# Patient Record
Sex: FEMALE | Race: WHITE | Employment: FULL TIME | URBAN - METROPOLITAN AREA
[De-identification: names, ages, dates, MRNs, and addresses within clinical notes are randomized per-mention and may not be internally consistent; named-entity substitution may affect disease eponyms.]

---

## 2017-01-06 ENCOUNTER — HOSPITAL ENCOUNTER (OUTPATIENT)
Dept: GENERAL RADIOLOGY | Age: 62
Discharge: HOME | End: 2017-01-06
Attending: INTERNAL MEDICINE
Payer: COMMERCIAL

## 2017-01-06 DIAGNOSIS — J40 BRONCHITIS: ICD-10-CM

## 2017-01-06 PROCEDURE — 71020 XR CHEST PA + LAT CHEST (CPT=71020): CPT

## 2017-01-21 PROCEDURE — 84443 ASSAY THYROID STIM HORMONE: CPT | Performed by: INTERNAL MEDICINE

## 2017-01-21 PROCEDURE — 85025 COMPLETE CBC W/AUTO DIFF WBC: CPT | Performed by: INTERNAL MEDICINE

## 2017-01-21 PROCEDURE — 80053 COMPREHEN METABOLIC PANEL: CPT | Performed by: INTERNAL MEDICINE

## 2017-01-21 PROCEDURE — 82306 VITAMIN D 25 HYDROXY: CPT | Performed by: INTERNAL MEDICINE

## 2017-01-21 PROCEDURE — 80061 LIPID PANEL: CPT | Performed by: INTERNAL MEDICINE

## 2018-08-18 ENCOUNTER — HOSPITAL ENCOUNTER (OUTPATIENT)
Dept: MAMMOGRAPHY | Age: 63
Discharge: HOME | End: 2018-08-18
Attending: INTERNAL MEDICINE
Payer: COMMERCIAL

## 2018-08-18 DIAGNOSIS — Z12.31 SCREENING MAMMOGRAM, ENCOUNTER FOR: ICD-10-CM

## 2018-08-18 PROCEDURE — 77067 SCR MAMMO BI INCL CAD: CPT | Performed by: INTERNAL MEDICINE

## 2018-08-25 ENCOUNTER — HOSPITAL ENCOUNTER (OUTPATIENT)
Dept: GENERAL RADIOLOGY | Age: 63
Discharge: HOME | End: 2018-08-25
Attending: INTERNAL MEDICINE
Payer: COMMERCIAL

## 2018-08-25 DIAGNOSIS — M54.12 CERVICAL RADICULOPATHY: ICD-10-CM

## 2018-08-25 DIAGNOSIS — S90.32XA CONTUSION OF LEFT FOOT, INITIAL ENCOUNTER: ICD-10-CM

## 2018-08-25 PROCEDURE — 72040 X-RAY EXAM NECK SPINE 2-3 VW: CPT | Performed by: INTERNAL MEDICINE

## 2018-08-25 PROCEDURE — 73630 X-RAY EXAM OF FOOT: CPT | Performed by: INTERNAL MEDICINE

## 2018-08-27 ENCOUNTER — TELEPHONE (OUTPATIENT)
Dept: PULMONOLOGY | Facility: CLINIC | Age: 63
End: 2018-08-27

## 2018-08-27 NOTE — TELEPHONE ENCOUNTER
Pt requesting to be seen sooner than next avail for consult as new pt for CASSANDRA. Pls call  Thank you.

## 2018-08-27 NOTE — TELEPHONE ENCOUNTER
LMTCB to confirm appt w/ Dr. Mary Rivas for [facility] 9/19 @ 4 pm for sleep consult

## 2018-08-29 NOTE — TELEPHONE ENCOUNTER
Pt is calling back to see if she can be seen sooner then first available states she had an appt but had to cancel due to being out of town.  Pt is also requesting information on where she can get her cpap machine repaired states she had it for 6 years there

## 2018-08-29 NOTE — TELEPHONE ENCOUNTER
Spoke w/ pt offered sooner appt 9/14 @ 4 pm in [place], location information provided. Pt informed we are unable to place any CPAP orders until pt is seen.  Pt reports PCP is the ordering MD for CPAP informed pt PCP can place order to CPAP company they [employer]

## 2018-08-30 PROBLEM — S92.535A CLOSED NONDISPLACED FRACTURE OF DISTAL PHALANX OF LESSER TOE OF LEFT FOOT, INITIAL ENCOUNTER: Status: ACTIVE | Noted: 2018-08-30

## 2018-09-14 ENCOUNTER — OFFICE VISIT (OUTPATIENT)
Dept: PULMONOLOGY | Facility: CLINIC | Age: 63
End: 2018-09-14
Payer: COMMERCIAL

## 2018-09-14 VITALS
OXYGEN SATURATION: 97 % | HEART RATE: 114 BPM | SYSTOLIC BLOOD PRESSURE: 155 MMHG | WEIGHT: 236 LBS | HEIGHT: 67 IN | DIASTOLIC BLOOD PRESSURE: 101 MMHG | RESPIRATION RATE: 18 BRPM | BODY MASS INDEX: 37.04 KG/M2

## 2018-09-14 DIAGNOSIS — G47.33 OSA (OBSTRUCTIVE SLEEP APNEA): Primary | ICD-10-CM

## 2018-09-14 PROCEDURE — 99212 OFFICE O/P EST SF 10 MIN: CPT | Performed by: INTERNAL MEDICINE

## 2018-09-14 PROCEDURE — 99243 OFF/OP CNSLTJ NEW/EST LOW 30: CPT | Performed by: INTERNAL MEDICINE

## 2018-09-14 NOTE — PROGRESS NOTES
Hortencia Bledsoe , [address], [place]    Report of Consultation    Rafa Najera Patient Status:  Outpatient    1955 MRN [id number]   Location [address] [address] [facility], [address] Attending No att. providers found   Hosp Day # 0 PCP Cali Summers Comment:  SMR Inferior turbinates  2009: OTHER SURGICAL HISTORY      Comment:  UPPP  No date: T&A    Family History  Family History   Problem Relation Age of Onset   • Cancer Mother    • Melanoma Other         Malignant melanoma   • Diabetes Neg    • Awanda Congress weight reduction     F/u in 3 months                        Thank you for allowing me to participate in the care of your patient. Gregorio Zabala  9/14/2018

## 2018-12-14 ENCOUNTER — OFFICE VISIT (OUTPATIENT)
Dept: PULMONOLOGY | Facility: CLINIC | Age: 63
End: 2018-12-14
Payer: COMMERCIAL

## 2018-12-14 VITALS
DIASTOLIC BLOOD PRESSURE: 98 MMHG | SYSTOLIC BLOOD PRESSURE: 159 MMHG | BODY MASS INDEX: 34.63 KG/M2 | WEIGHT: 220.63 LBS | HEART RATE: 106 BPM | HEIGHT: 67 IN | RESPIRATION RATE: 18 BRPM

## 2018-12-14 DIAGNOSIS — G47.33 OSA ON CPAP: Primary | ICD-10-CM

## 2018-12-14 DIAGNOSIS — Z99.89 OSA ON CPAP: Primary | ICD-10-CM

## 2018-12-14 PROCEDURE — 99212 OFFICE O/P EST SF 10 MIN: CPT | Performed by: INTERNAL MEDICINE

## 2018-12-14 PROCEDURE — 99213 OFFICE O/P EST LOW 20 MIN: CPT | Performed by: INTERNAL MEDICINE

## 2018-12-14 RX ORDER — MULTIVITAMIN
TABLET ORAL
COMMUNITY

## 2018-12-14 NOTE — PROGRESS NOTES
HPI:    Patient ID: Fabby Monsalve is a 59year old female. HPI    Review of Systems         Current Outpatient Medications:  Multiple Vitamin (MULTI-VITAMIN DAILY) Oral Tab Take by mouth.  Disp:  Rfl:    furosemide (LASIX) 20 MG Oral Tab Take 1 table

## 2019-04-12 ENCOUNTER — TELEPHONE (OUTPATIENT)
Dept: PULMONOLOGY | Facility: CLINIC | Age: 64
End: 2019-04-12

## 2019-04-12 NOTE — TELEPHONE ENCOUNTER
Britta/TANVIR calling to confirm fax sent 4/10/19 was received for cpap supplies, pls call at:[phone],thanks.

## 2019-04-12 NOTE — TELEPHONE ENCOUNTER
Informed Maryanne Guardado from [address] [place] [address] that fax was received for CPAP supplies. Informed her will fax order back once Dr. Chris Ramirez signs order. Order placed in Dr. Mague Gamboa folder.

## 2019-05-29 ENCOUNTER — APPOINTMENT (OUTPATIENT)
Dept: GENERAL RADIOLOGY | Facility: HOSPITAL | Age: 64
End: 2019-05-29
Attending: NURSE PRACTITIONER
Payer: COMMERCIAL

## 2019-05-29 ENCOUNTER — HOSPITAL ENCOUNTER (EMERGENCY)
Facility: HOSPITAL | Age: 64
Discharge: HOME | End: 2019-05-29
Payer: COMMERCIAL

## 2019-05-29 VITALS
DIASTOLIC BLOOD PRESSURE: 82 MMHG | RESPIRATION RATE: 20 BRPM | HEART RATE: 94 BPM | OXYGEN SATURATION: 97 % | SYSTOLIC BLOOD PRESSURE: 146 MMHG | TEMPERATURE: 98 F

## 2019-05-29 DIAGNOSIS — S82.831A CLOSED FRACTURE OF DISTAL END OF RIGHT FIBULA, UNSPECIFIED FRACTURE MORPHOLOGY, INITIAL ENCOUNTER: Primary | ICD-10-CM

## 2019-05-29 PROCEDURE — 73610 X-RAY EXAM OF ANKLE: CPT | Performed by: NURSE PRACTITIONER

## 2019-05-29 PROCEDURE — 29515 APPLICATION SHORT LEG SPLINT: CPT

## 2019-05-29 PROCEDURE — 99284 EMERGENCY DEPT VISIT MOD MDM: CPT

## 2019-05-29 NOTE — ED PROVIDER NOTES
Patient Seen in: [facility] AND [facility] Emergency Department    History   Patient presents with:  Lower Extremity Injury (musculoskeletal)    Stated Complaint: right ankle pains    HPI    26-year-old female presents to the emergency department complaining of /86   Pulse 95   Resp 20   Temp 97.8 °F (36.6 °C)   Temp src    SpO2 98 %   O2 Device        Current:/86   Pulse 95   Temp 97.8 °F (36.6 °C)   Resp 20   SpO2 98%         Physical Exam   Constitutional: She is oriented to person, place, and ti symptoms worsen return to the ER        Medications Prescribed:  Current Discharge Medication List

## 2019-05-30 PROBLEM — S82.831A CLOSED AVULSION FRACTURE OF DISTAL FIBULA, RIGHT, INITIAL ENCOUNTER: Status: ACTIVE | Noted: 2019-05-30

## 2019-08-24 ENCOUNTER — OFFICE VISIT (OUTPATIENT)
Dept: OPHTHALMOLOGY | Facility: CLINIC | Age: 64
End: 2019-08-24
Payer: COMMERCIAL

## 2019-08-24 DIAGNOSIS — H52.222 REGULAR ASTIGMATISM OF LEFT EYE: ICD-10-CM

## 2019-08-24 DIAGNOSIS — H26.9 CORTICAL CATARACT OF BOTH EYES: Primary | ICD-10-CM

## 2019-08-24 DIAGNOSIS — H52.03 HYPEROPIA OF BOTH EYES WITH ASTIGMATISM: ICD-10-CM

## 2019-08-24 DIAGNOSIS — H52.203 HYPEROPIA OF BOTH EYES WITH ASTIGMATISM: ICD-10-CM

## 2019-08-24 DIAGNOSIS — H52.4 PRESBYOPIA: ICD-10-CM

## 2019-08-24 PROCEDURE — 92002 INTRM OPH EXAM NEW PATIENT: CPT | Performed by: OPHTHALMOLOGY

## 2019-08-24 PROCEDURE — 92015 DETERMINE REFRACTIVE STATE: CPT | Performed by: OPHTHALMOLOGY

## 2019-08-24 NOTE — PATIENT INSTRUCTIONS
Hyperopia of both eyes with astigmatism  New glasses    Cortical cataract of both eyes  Mild no surgery yet    Presbyopia  New glasses

## 2019-08-24 NOTE — PROGRESS NOTES
Ruth Goss is a 59year old female. HPI:     HPI     EP/ 59 yr old here for a complete exam. /12; last seen on 2/2/15 by [facility] with Hx of hypermetropia, presbyopia, astigmatism, cataracts OU and tearing.  Pt was seen last year for a dilated e MEDOXOMIL-HCTZ 20-12.5 MG Oral Tab TAKE 1 TABLET BY MOUTH EVERY DAY Disp: 90 tablet Rfl: 1   AMLODIPINE-ATORVASTATIN 5-10 MG Oral Tab TAKE 1 TABLET BY MOUTH EVERY DAY Disp: 90 tablet Rfl: 1   FUROSEMIDE 20 MG Oral Tab TAKE 1 TABLET BY MOUTH EVERY DAY Disp: Cylinder Dist VA Add Near [facility]    Right +2.00 Sphere 20/20 +3.00 20/20    Left +2.50 Sphere 20/20- +3.00 20/20          Final Rx       Sphere Cylinder Dist VA Add Near [facility]    Right +2.00 Sphere 20/20 +3.00 20/20    Left +2.50 Sphere 20/20- +3.00 20/20    Type

## 2019-09-20 ENCOUNTER — TELEPHONE (OUTPATIENT)
Dept: OPHTHALMOLOGY | Facility: CLINIC | Age: 64
End: 2019-09-20

## 2019-09-20 NOTE — TELEPHONE ENCOUNTER
Pt is requesting for her rx be put in her mychart so she can print it out due to losing her hardcopy.

## 2020-08-07 ENCOUNTER — OFFICE VISIT (OUTPATIENT)
Dept: OPHTHALMOLOGY | Facility: CLINIC | Age: 65
End: 2020-08-07
Payer: COMMERCIAL

## 2020-08-07 DIAGNOSIS — H52.03 HYPEROPIA OF BOTH EYES WITH ASTIGMATISM: ICD-10-CM

## 2020-08-07 DIAGNOSIS — H26.9 CORTICAL CATARACT OF BOTH EYES: Primary | ICD-10-CM

## 2020-08-07 DIAGNOSIS — H52.4 PRESBYOPIA: ICD-10-CM

## 2020-08-07 DIAGNOSIS — H52.203 HYPEROPIA OF BOTH EYES WITH ASTIGMATISM: ICD-10-CM

## 2020-08-07 PROCEDURE — 92014 COMPRE OPH EXAM EST PT 1/>: CPT | Performed by: OPHTHALMOLOGY

## 2020-08-07 PROCEDURE — 92015 DETERMINE REFRACTIVE STATE: CPT | Performed by: OPHTHALMOLOGY

## 2020-08-07 NOTE — PROGRESS NOTES
Carol Abebe is a 62year old female. HPI:     HPI     EP/62year old here for a complete exam.  LDE was 8/24/19 with a history of hyperopia with astigmatism OU, presbyopia OU and cortical cataract OU. Patient states her vision is good.   She denie (OZEMPIC, 0.25 OR 0.5 MG/DOSE,) 2 MG/1.5ML Subcutaneous Solution Pen-injector Inject 0.5 mg into the skin once a week. 3 pen 0   • naproxen 500 MG Oral Tab Take 1 tablet (500 mg total) by mouth 2 (two) times daily with meals.  60 tablet 1   • OLMESARTAN MED Sphere  3.00    Type:  Progressive bifocal          Wearing Rx #2       Sphere Cylinder Axis Add    Right +2.75 +0.50 160 +2.75    Left +3.25 Sphere  +2.75    Type:  Computer progressive          Manifest Refraction       Sphere Cylinder Dist VA Add Near V

## 2020-08-07 NOTE — PATIENT INSTRUCTIONS
Hyperopia of both eyes with astigmatism  New glasses    Cortical cataract of both eyes  Moderate, no surgery yet

## 2020-11-17 ENCOUNTER — TELEPHONE (OUTPATIENT)
Dept: OPHTHALMOLOGY | Facility: CLINIC | Age: 65
End: 2020-11-17

## 2020-11-17 NOTE — TELEPHONE ENCOUNTER
Pt didn't even get the glasses made yet. She is ordering glasses online and noticed a difference in her right Rx from last year to this year and wanted an explanation before ordering glasses online.

## 2021-11-30 PROBLEM — E11.9 TYPE 2 DIABETES MELLITUS (HCC): Status: ACTIVE | Noted: 2021-11-30

## 2021-12-01 ENCOUNTER — TELEPHONE (OUTPATIENT)
Dept: DERMATOLOGY CLINIC | Facility: CLINIC | Age: 66
End: 2021-12-01

## 2021-12-01 ENCOUNTER — OFFICE VISIT (OUTPATIENT)
Dept: DERMATOLOGY CLINIC | Facility: CLINIC | Age: 66
End: 2021-12-01
Payer: COMMERCIAL

## 2021-12-01 DIAGNOSIS — D23.70 BENIGN NEOPLASM OF SKIN OF LOWER LIMB, INCLUDING HIP, UNSPECIFIED LATERALITY: ICD-10-CM

## 2021-12-01 DIAGNOSIS — D22.9 MULTIPLE NEVI: ICD-10-CM

## 2021-12-01 DIAGNOSIS — D48.5 NEOPLASM OF UNCERTAIN BEHAVIOR OF SKIN: Primary | ICD-10-CM

## 2021-12-01 DIAGNOSIS — D23.5 BENIGN NEOPLASM OF SKIN OF TRUNK, EXCEPT SCROTUM: ICD-10-CM

## 2021-12-01 DIAGNOSIS — D23.4 BENIGN NEOPLASM OF SCALP AND SKIN OF NECK: ICD-10-CM

## 2021-12-01 DIAGNOSIS — D23.30 BENIGN NEOPLASM OF SKIN OF FACE: ICD-10-CM

## 2021-12-01 DIAGNOSIS — D23.60 BENIGN NEOPLASM OF SKIN OF UPPER LIMB, INCLUDING SHOULDER, UNSPECIFIED LATERALITY: ICD-10-CM

## 2021-12-01 PROCEDURE — 88305 TISSUE EXAM BY PATHOLOGIST: CPT | Performed by: DERMATOLOGY

## 2021-12-01 PROCEDURE — 11102 TANGNTL BX SKIN SINGLE LES: CPT | Performed by: DERMATOLOGY

## 2021-12-01 PROCEDURE — 99203 OFFICE O/P NEW LOW 30 MIN: CPT | Performed by: DERMATOLOGY

## 2021-12-01 RX ORDER — AZELAIC ACID 0.15 G/G
1 AEROSOL, FOAM TOPICAL DAILY
Qty: 50 G | Refills: 3 | Status: SHIPPED | OUTPATIENT
Start: 2021-12-01 | End: 2022-01-24

## 2021-12-03 ENCOUNTER — TELEPHONE (OUTPATIENT)
Dept: DERMATOLOGY CLINIC | Facility: CLINIC | Age: 66
End: 2021-12-03

## 2021-12-03 NOTE — TELEPHONE ENCOUNTER
Pathology report received by Dr. Ulysses Howe. Results routed to Dr. Yahaira Diaz for further evaluation.

## 2021-12-05 NOTE — PROGRESS NOTES
The pathology report from last visit showed melanoma in situ from anterior thigh. No invasive melanoma. Does need wide excision. Would have her see Dr. Javid Cho  should not need lymph nodes, but she will need regular f/u q ~3 mos for now with me.

## 2021-12-06 NOTE — TELEPHONE ENCOUNTER
Medication PA Requested:     azeleic acid foam                                                     CoverMyMeds Used:yes  Key:see below  Quantity:50  Day Supply:30  Sig: apply qd  DX Code:       L71.9                              CPT code (if applicable):

## 2021-12-06 NOTE — PROGRESS NOTES
Results logged in. Patient informed of test results and all KMT's recommendations. Voiced understanding.  Pt asked to have both surgeons info sent to her via [employer], she laina do her research and CB - info sent, awaiting CB from pt

## 2021-12-08 ENCOUNTER — OFFICE VISIT (OUTPATIENT)
Dept: SURGERY | Facility: CLINIC | Age: 66
End: 2021-12-08
Payer: MEDICARE

## 2021-12-08 VITALS
HEART RATE: 108 BPM | WEIGHT: 191 LBS | BODY MASS INDEX: 30 KG/M2 | DIASTOLIC BLOOD PRESSURE: 73 MMHG | SYSTOLIC BLOOD PRESSURE: 130 MMHG | OXYGEN SATURATION: 100 % | RESPIRATION RATE: 20 BRPM | TEMPERATURE: 98 F

## 2021-12-08 DIAGNOSIS — D03.72 MELANOMA IN SITU OF LEFT LOWER EXTREMITY INCLUDING HIP (HCC): Primary | ICD-10-CM

## 2021-12-08 PROCEDURE — 99205 OFFICE O/P NEW HI 60 MIN: CPT | Performed by: SURGERY

## 2021-12-08 NOTE — CONSULTS
[facility]    Patient Name:  Renata Hays   YOB: 1955   Gender:  Female   Appt Date:  12/8/2021   Provider:  May Sever, MD   Insurance:  MEDICARE PART B ONLY     PATIENT PROVIDERS  Referri Acid (FINACEA) 15 % External Foam, Apply 1 Application topically daily. , Disp: 50 g, Rfl: 3  •  OLMESARTAN MEDOXOMIL-HCTZ 20-12.5 MG Oral Tab, TAKE 1 TABLET BY MOUTH EVERY DAY, Disp: 90 tablet, Rfl: 0  •  Multiple Vitamin (MULTI-VITAMIN DAILY) Oral Tab, Ta OTHER SURGICAL HISTORY  2009    Nasal septoplasty   • OTHER SURGICAL HISTORY  2009    SMR Inferior turbinates   • OTHER SURGICAL HISTORY  2009    UPPP   • T&A        Reviewed Social History:  Social History    Socioeconomic History      Marital status: Mar no distension. Palpations: Abdomen is soft. Tenderness: There is no abdominal tenderness. Musculoskeletal:         General: Normal range of motion. Cervical back: Normal range of motion. Skin:     General: Skin is warm and dry.

## 2021-12-08 NOTE — H&P (VIEW-ONLY)
[facility]    Patient Name:  Jaskaran Hart   YOB: 1955   Gender:  Female   Appt Date:  12/8/2021   Provider:  Hilda Wynn MD   Insurance:  MEDICARE PART B ONLY     PATIENT PROVIDERS  Referri Acid (FINACEA) 15 % External Foam, Apply 1 Application topically daily. , Disp: 50 g, Rfl: 3  •  OLMESARTAN MEDOXOMIL-HCTZ 20-12.5 MG Oral Tab, TAKE 1 TABLET BY MOUTH EVERY DAY, Disp: 90 tablet, Rfl: 0  •  Multiple Vitamin (MULTI-VITAMIN DAILY) Oral Tab, Ta OTHER SURGICAL HISTORY  2009    Nasal septoplasty   • OTHER SURGICAL HISTORY  2009    SMR Inferior turbinates   • OTHER SURGICAL HISTORY  2009    UPPP   • T&A        Reviewed Social History:  Social History    Socioeconomic History      Marital status: Mar no distension. Palpations: Abdomen is soft. Tenderness: There is no abdominal tenderness. Musculoskeletal:         General: Normal range of motion. Cervical back: Normal range of motion. Skin:     General: Skin is warm and dry.

## 2021-12-08 NOTE — PATIENT INSTRUCTIONS
Surgery: Wide local excision left thigh melanoma (local anesthesia)    Date of Surgery: [facility]    Hospital:    [address] [facility]   Phone: [phone]    · This is an outpatient procedure.   · Use the provided [facility]     Dr. Leah Lassiter nurse direct line:  [phone]  Monday through Friday 8:30 am to 4:30 pm    For Dr. Leah Lassiter office: [phone]/ Fax: [phone]  After hours you will reach the answering service     Central Schedulin-284

## 2021-12-09 ENCOUNTER — TELEPHONE (OUTPATIENT)
Dept: SURGERY | Facility: CLINIC | Age: 66
End: 2021-12-09

## 2021-12-09 NOTE — TELEPHONE ENCOUNTER
Started [employer] FEP form. Awaiting completed office visit note to submit PA and answer PA questions.

## 2021-12-13 NOTE — PROGRESS NOTES
Chasity Owens is a 73year old female. HPI:     CC:  Patient presents with:  Lesion: LOV 10/2015 with LSS. Patient presents for full body check. Lesions to grey legs that are new. Denies bleeding or changes.  Personal hx of severely dysplastic nevus to • OLMESARTAN MEDOXOMIL-HCTZ 20-12.5 MG Oral Tab TAKE 1 TABLET BY MOUTH EVERY DAY 90 tablet 0   • Multiple Vitamin (MULTI-VITAMIN DAILY) Oral Tab Take by mouth.      • LORazepam (ATIVAN) 0.5 MG Oral Tab Take 1 tablet (0.5 mg total) by mouth 2 (two) times d status:       Spouse name: Not on file      Number of children: Not on file      Years of education: Not on file      Highest education level: Not on file    Occupational History      Not on file    Tobacco Use      Smoking status: Never Smoker dysplastic nevus to left knee 2015. Father with hx of unknown skin ca. Past notes/ records and appropriate/relevant lab results including pathology and past body maps reviewed. Including outside notes/ PCP notes as appropriate.  Updated and new inform including shoulder, unspecified laterality  Benign neoplasm of skin of lower limb, including hip, unspecified laterality    See details on map.       Remarkable for:    pt with hx dysplastic nevus  Spec 1 Description >>>>>: left anterior thigh  Spec 1 Comme regarding other benign skin lesions. Signs and symptoms of skin cancer, ABCDE's of melanoma discussed with patient. Sunscreen use, sun protection, self exams reviewed. Followup as noted RTC routine checkup 6 mos - one year or p.r.n.     Encounter Times Incl

## 2021-12-13 NOTE — PROGRESS NOTES
Operative Report                     Shave/  Tangential biopsy     Clinical diagnosis:    Size of lesion:    Location:pt with hx dysplastic nevus  Spec 1 Description >>>>>: left anterior thigh  Spec 1 Comment: r/o atypical nevus with red brown plaque

## 2021-12-28 NOTE — TELEPHONE ENCOUNTER
Medication PA Requested:  Azelaic Acid (FINACEA) 15 % External Foam                                                 Quantity:50  Day Supply:30  Sig: apply qd  DX Code:       L71.9     [facility] FEP form has been completed and faxed with last office visi

## 2021-12-29 NOTE — TELEPHONE ENCOUNTER
Fax received from [facility] [phone] Fax      They had few additional questions on finacea foam PA - questions answered, afxed back to plan and sent to scan.     Awaiting determination

## 2021-12-30 RX ORDER — AZELAIC ACID 0.15 G/G
GEL TOPICAL
Qty: 50 G | Refills: 3 | Status: SHIPPED | OUTPATIENT
Start: 2021-12-30 | End: 2021-12-31

## 2021-12-30 NOTE — TELEPHONE ENCOUNTER
Per Ginger's prior message the gel was not covered. They can try to run the gel. if this does not go through than will need to try an alternative topical or a specialty pharmacy may be an option.

## 2021-12-31 NOTE — TELEPHONE ENCOUNTER
Fax from [facility]    Pa required for Azelaic Acid 15% gel    KEY: [id number]    Placed fax in pa inbox

## 2021-12-31 NOTE — TELEPHONE ENCOUNTER
Rx options reviewed with pt. Pt states she would like Finacea gel to please be sent to specialty pharmacy. Rx sent to [facility] (copay should be around $35). Rx sent, pt provided with contact information for [facility].

## 2022-01-03 DIAGNOSIS — D03.72 MELANOMA IN SITU OF LEFT LOWER EXTREMITY INCLUDING HIP (HCC): Primary | ICD-10-CM

## 2022-01-04 ENCOUNTER — TELEPHONE (OUTPATIENT)
Dept: SURGERY | Facility: CLINIC | Age: 67
End: 2022-01-04

## 2022-01-04 DIAGNOSIS — D03.72 MELANOMA IN SITU OF LEFT LOWER EXTREMITY INCLUDING HIP (HCC): Primary | ICD-10-CM

## 2022-01-04 NOTE — TELEPHONE ENCOUNTER
Pt called asking if her surgery with Dr. Yanet Conroy is still scheduled for 1/7. There was some confusion as pt was contacted by PAT and did not receive a f/u phone call if pt was still eligible for surgery on 1/7 due to covid + exposure on 12/22.  This RN spo

## 2022-01-05 ENCOUNTER — LAB ENCOUNTER (OUTPATIENT)
Dept: LAB | Facility: HOSPITAL | Age: 67
End: 2022-01-05
Attending: SURGERY
Payer: COMMERCIAL

## 2022-01-05 DIAGNOSIS — Z01.818 PREOP TESTING: ICD-10-CM

## 2022-01-05 LAB — SARS-COV-2 RNA RESP QL NAA+PROBE: NOT DETECTED

## 2022-01-07 ENCOUNTER — HOSPITAL ENCOUNTER (OUTPATIENT)
Facility: HOSPITAL | Age: 67
Setting detail: HOSPITAL OUTPATIENT SURGERY
Discharge: HOME | End: 2022-01-07
Attending: SURGERY | Admitting: SURGERY
Payer: COMMERCIAL

## 2022-01-07 VITALS
HEART RATE: 96 BPM | RESPIRATION RATE: 16 BRPM | OXYGEN SATURATION: 99 % | DIASTOLIC BLOOD PRESSURE: 85 MMHG | HEIGHT: 66.5 IN | SYSTOLIC BLOOD PRESSURE: 127 MMHG | TEMPERATURE: 98 F | WEIGHT: 191 LBS | BODY MASS INDEX: 30.34 KG/M2

## 2022-01-07 DIAGNOSIS — D03.72 MELANOMA IN SITU OF LEFT LOWER EXTREMITY INCLUDING HIP (HCC): ICD-10-CM

## 2022-01-07 DIAGNOSIS — Z01.818 PREOP TESTING: Primary | ICD-10-CM

## 2022-01-07 PROCEDURE — 0JBM0ZX EXCISION OF LEFT UPPER LEG SUBCUTANEOUS TISSUE AND FASCIA, OPEN APPROACH, DIAGNOSTIC: ICD-10-PCS | Performed by: SURGERY

## 2022-01-07 PROCEDURE — 88305 TISSUE EXAM BY PATHOLOGIST: CPT | Performed by: SURGERY

## 2022-01-07 PROCEDURE — 88307 TISSUE EXAM BY PATHOLOGIST: CPT | Performed by: SURGERY

## 2022-01-07 RX ORDER — BUPIVACAINE HYDROCHLORIDE 5 MG/ML
INJECTION, SOLUTION EPIDURAL; INTRACAUDAL AS NEEDED
Status: DISCONTINUED | OUTPATIENT
Start: 2022-01-07 | End: 2022-01-07 | Stop reason: HOSPADM

## 2022-01-07 RX ORDER — TRAMADOL HYDROCHLORIDE 50 MG/1
TABLET ORAL EVERY 4 HOURS PRN
Qty: 20 TABLET | Refills: 0 | Status: SHIPPED | OUTPATIENT
Start: 2022-01-07 | End: 2022-01-24

## 2022-01-07 RX ORDER — LIDOCAINE HYDROCHLORIDE AND EPINEPHRINE 10; 10 MG/ML; UG/ML
INJECTION, SOLUTION INFILTRATION; PERINEURAL AS NEEDED
Status: DISCONTINUED | OUTPATIENT
Start: 2022-01-07 | End: 2022-01-07 | Stop reason: HOSPADM

## 2022-01-07 NOTE — H&P
[facility] HOSP - [facility]    History and Physical    Shekhar Najera Patient Status:  Hospital Outpatient Surgery    1955 MRN [id number]   Location [address] [facility] Attending Maximo Rush MD;Maria A*   Hosp Day # 0 PCP Chiquita Baez status: Never Smoker      Smokeless tobacco: Never Used    Alcohol use: Yes      Alcohol/week: 0.0 standard drinks      Comment: Occasionally    Drug use: No    Allergies/Medications:    Allergies: No Known Allergies  OLMESARTAN MEDOXOMIL-HCTZ 20-12.5 MG Or office    Results:     Lab Results   Component Value Date    WBC 9.1 11/30/2021    HGB 11.9 11/30/2021    HCT 35.8 11/30/2021     11/30/2021    CREATSERUM 1.44 (H) 11/30/2021    BUN 29 (H) 11/30/2021     11/30/2021    K 4.0 11/30/2021    CL 10

## 2022-01-07 NOTE — OPERATIVE REPORT
Date of operation 1/7/2022    Preoperative diagnosis:  1. Left thigh melanoma in situ    Operations performed:  1. Wide local excision, 13.5 x 2.7 cm left thigh melanoma in situ  2.   Complex repair, defect measuring 13.5 x 2.7 cm, left thigh    [id number]  [email or web address]

## 2022-01-07 NOTE — INTERVAL H&P NOTE
Patient seen and examined. No changes to the attached history and physical are noted. 73year old female presenting today for planned left thigh WLE. Cathy Granger MD  Complex General Surgical Oncology  [facility]  Pager [phone]  [facility]

## 2022-01-10 ENCOUNTER — TELEPHONE (OUTPATIENT)
Dept: SURGERY | Facility: CLINIC | Age: 67
End: 2022-01-10

## 2022-01-10 NOTE — TELEPHONE ENCOUNTER
Called pt, doing well after surgery with Dr. Mario Arias on 1/7/2022. No issues except minor itching about surgical site but no redness, path results discussed, Dr. Mario Arias will discuss in greater detail at post op visit on 1/19.

## 2022-01-19 ENCOUNTER — OFFICE VISIT (OUTPATIENT)
Dept: SURGERY | Facility: CLINIC | Age: 67
End: 2022-01-19
Payer: MEDICARE

## 2022-01-19 VITALS
SYSTOLIC BLOOD PRESSURE: 111 MMHG | OXYGEN SATURATION: 99 % | WEIGHT: 190 LBS | HEART RATE: 92 BPM | BODY MASS INDEX: 30 KG/M2 | DIASTOLIC BLOOD PRESSURE: 76 MMHG

## 2022-01-19 DIAGNOSIS — D03.72 MELANOMA IN SITU OF LEFT LOWER EXTREMITY INCLUDING HIP (HCC): Primary | ICD-10-CM

## 2022-01-19 PROCEDURE — 99024 POSTOP FOLLOW-UP VISIT: CPT | Performed by: SURGERY

## 2022-01-19 NOTE — PROGRESS NOTES
[facility]    Patient Name:  Inna Najera   YOB: 1955   Gender:  Female   Appt Date:  1/19/2022   Provider:  Hilda Wynn MD   Insurance:  [address] [place], [place] [address]e needed for Pain.  (Patient not taking: Reported on 1/19/2022), Disp: 20 tablet, Rfl: 0  •  Azelaic Acid (FINACEA) 15 % External Gel, Use bid as directed to face (Patient not taking: Reported on 1/19/2022), Disp: 50 g, Rfl: 3  •  Azelaic Acid (FINACEA) 15 % OTHER SURGICAL HISTORY  2009    [facility] Inferior turbinates   • OTHER SURGICAL HISTORY  2009    UPPP   • T&A        Reviewed Social History:  Social History    Socioeconomic History      Marital status:     Tobacco Use      Smoking status: Never Smoker re-excision with appropriate margins is recommended    PATHOLOGY 1/7/2022:  Final Diagnosis:    Skin, left thigh; wide local excision:   · Previous biopsy site with reactive fibrosis and mild chronic inflammation.   · No residual melanocytic lesion is ident

## 2022-01-27 ENCOUNTER — HOSPITAL ENCOUNTER (OUTPATIENT)
Dept: ULTRASOUND IMAGING | Facility: HOSPITAL | Age: 67
Discharge: HOME | End: 2022-01-27
Attending: INTERNAL MEDICINE
Payer: COMMERCIAL

## 2022-01-27 DIAGNOSIS — N18.2 STAGE 2 CHRONIC KIDNEY DISEASE: ICD-10-CM

## 2022-01-27 PROCEDURE — 76775 US EXAM ABDO BACK WALL LIM: CPT | Performed by: INTERNAL MEDICINE

## 2022-02-04 ENCOUNTER — OFFICE VISIT (OUTPATIENT)
Dept: OPHTHALMOLOGY | Facility: CLINIC | Age: 67
End: 2022-02-04
Payer: COMMERCIAL

## 2022-02-04 DIAGNOSIS — H52.203 HYPEROPIA OF BOTH EYES WITH ASTIGMATISM: ICD-10-CM

## 2022-02-04 DIAGNOSIS — H52.03 HYPEROPIA OF BOTH EYES WITH ASTIGMATISM: ICD-10-CM

## 2022-02-04 DIAGNOSIS — H52.4 PRESBYOPIA: ICD-10-CM

## 2022-02-04 DIAGNOSIS — H26.9 CORTICAL CATARACT OF BOTH EYES: Primary | ICD-10-CM

## 2022-02-04 PROCEDURE — 92014 COMPRE OPH EXAM EST PT 1/>: CPT | Performed by: OPHTHALMOLOGY

## 2022-02-04 PROCEDURE — 92015 DETERMINE REFRACTIVE STATE: CPT | Performed by: OPHTHALMOLOGY

## 2022-02-04 NOTE — PATIENT INSTRUCTIONS
Hyperopia of both eyes with astigmatism  New glasses. Bifocals and computer glasses. Cortical cataract of both eyes  Moderate, no surgery yet. See how new glasses help vision.     Presbyopia  New glasses

## 2022-10-10 ENCOUNTER — APPOINTMENT (OUTPATIENT)
Dept: GENERAL RADIOLOGY | Age: 67
End: 2022-10-10
Attending: NURSE PRACTITIONER
Payer: COMMERCIAL

## 2022-10-10 ENCOUNTER — HOSPITAL ENCOUNTER (OUTPATIENT)
Age: 67
Discharge: HOME | End: 2022-10-10
Payer: COMMERCIAL

## 2022-10-10 VITALS
SYSTOLIC BLOOD PRESSURE: 164 MMHG | DIASTOLIC BLOOD PRESSURE: 102 MMHG | TEMPERATURE: 97 F | OXYGEN SATURATION: 100 % | HEART RATE: 81 BPM | RESPIRATION RATE: 18 BRPM
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
## 2022-10-10 DIAGNOSIS — T07.XXXA MULTIPLE CONTUSIONS: ICD-10-CM

## 2022-10-10 DIAGNOSIS — H61.21 IMPACTED CERUMEN OF RIGHT EAR: ICD-10-CM

## 2022-10-10 DIAGNOSIS — I10 HYPERTENSION, UNSPECIFIED TYPE: ICD-10-CM

## 2022-10-10 DIAGNOSIS — S20.211A RIB CONTUSION, RIGHT, INITIAL ENCOUNTER: ICD-10-CM

## 2022-10-10 PROCEDURE — 71101 X-RAY EXAM UNILAT RIBS/CHEST: CPT | Performed by: NURSE PRACTITIONER

## 2022-10-10 PROCEDURE — 73080 X-RAY EXAM OF ELBOW: CPT | Performed by: NURSE PRACTITIONER

## 2022-10-10 PROCEDURE — 73590 X-RAY EXAM OF LOWER LEG: CPT | Performed by: NURSE PRACTITIONER

## 2022-10-10 PROCEDURE — 99214 OFFICE O/P EST MOD 30 MIN: CPT | Performed by: NURSE PRACTITIONER

## 2022-10-10 PROCEDURE — 73502 X-RAY EXAM HIP UNI 2-3 VIEWS: CPT | Performed by: NURSE PRACTITIONER

## 2022-10-10 NOTE — ED INITIAL ASSESSMENT (HPI)
Mechanical fall on Saturday, fell backwards onto cuong iron fence. Pt states left leg got stuck on fence. Denies LOC. Pt noted bruise to right arm, and pain to right ribcage. Pt reports being unable to hear out of right ear, which is new since the fall. C/o neck soreness 6/10 (was at chiro today for chronic neck pain, but it is worse today). C/o tailbone pain as well.  Denies dizziness

## 2022-11-30 ENCOUNTER — LAB REQUISITION (OUTPATIENT)
Dept: SURGERY | Age: 67
End: 2022-11-30
Payer: COMMERCIAL

## 2022-11-30 DIAGNOSIS — Z12.11 SPECIAL SCREENING FOR MALIGNANT NEOPLASMS, COLON: ICD-10-CM

## 2022-11-30 PROCEDURE — 88305 TISSUE EXAM BY PATHOLOGIST: CPT | Performed by: SURGERY

## 2022-12-08 ENCOUNTER — HOSPITAL ENCOUNTER (EMERGENCY)
Facility: HOSPITAL | Age: 67
Discharge: HOME | End: 2022-12-08
Attending: EMERGENCY MEDICINE
Payer: COMMERCIAL

## 2022-12-08 VITALS
RESPIRATION RATE: 16 BRPM | TEMPERATURE: 98 F | DIASTOLIC BLOOD PRESSURE: 95 MMHG | HEIGHT: 66 IN | OXYGEN SATURATION: 99 % | WEIGHT: 197 LBS | BODY MASS INDEX: 31.66 KG/M2 | HEART RATE: 90 BPM | SYSTOLIC BLOOD PRESSURE: 146 MMHG

## 2022-12-08 DIAGNOSIS — S80.12XA TRAUMATIC HEMATOMA OF LEFT LOWER LEG WITH INFECTION, INITIAL ENCOUNTER: Primary | ICD-10-CM

## 2022-12-08 DIAGNOSIS — L08.9 TRAUMATIC HEMATOMA OF LEFT LOWER LEG WITH INFECTION, INITIAL ENCOUNTER: Primary | ICD-10-CM

## 2022-12-08 LAB
ANION GAP SERPL CALC-SCNC: 7 MMOL/L (ref 0–18)
BASOPHILS # BLD AUTO: 0.06 X10(3) UL (ref 0–0.2)
BASOPHILS NFR BLD AUTO: 0.7 %
BUN BLD-MCNC: 16 MG/DL (ref 7–18)
BUN/CREAT SERPL: 17 (ref 10–20)
CALCIUM BLD-MCNC: 9.9 MG/DL (ref 8.5–10.1)
CHLORIDE SERPL-SCNC: 105 MMOL/L (ref 98–112)
CO2 SERPL-SCNC: 27 MMOL/L (ref 21–32)
CREAT BLD-MCNC: 0.94 MG/DL
DEPRECATED RDW RBC AUTO: 42.5 FL (ref 35.1–46.3)
EOSINOPHIL # BLD AUTO: 0.17 X10(3) UL (ref 0–0.7)
EOSINOPHIL NFR BLD AUTO: 2 %
ERYTHROCYTE [DISTWIDTH] IN BLOOD BY AUTOMATED COUNT: 13.1 % (ref 11–15)
GFR SERPLBLD BASED ON 1.73 SQ M-ARVRAT: 67 ML/MIN/1.73M2 (ref 60–?)
GLUCOSE BLD-MCNC: 88 MG/DL (ref 70–99)
HCT VFR BLD AUTO: 43.2 %
HGB BLD-MCNC: 14 G/DL
IMM GRANULOCYTES # BLD AUTO: 0.03 X10(3) UL (ref 0–1)
IMM GRANULOCYTES NFR BLD: 0.4 %
LYMPHOCYTES # BLD AUTO: 2.17 X10(3) UL (ref 1–4)
LYMPHOCYTES NFR BLD AUTO: 25.7 %
MCH RBC QN AUTO: 28.6 PG (ref 26–34)
MCHC RBC AUTO-ENTMCNC: 32.4 G/DL (ref 31–37)
MCV RBC AUTO: 88.3 FL
MONOCYTES # BLD AUTO: 0.78 X10(3) UL (ref 0.1–1)
MONOCYTES NFR BLD AUTO: 9.2 %
NEUTROPHILS # BLD AUTO: 5.23 X10 (3) UL (ref 1.5–7.7)
NEUTROPHILS # BLD AUTO: 5.23 X10(3) UL (ref 1.5–7.7)
NEUTROPHILS NFR BLD AUTO: 62 %
OSMOLALITY SERPL CALC.SUM OF ELEC: 289 MOSM/KG (ref 275–295)
PLATELET # BLD AUTO: 366 10(3)UL (ref 150–450)
POTASSIUM SERPL-SCNC: 3.9 MMOL/L (ref 3.5–5.1)
RBC # BLD AUTO: 4.89 X10(6)UL
SODIUM SERPL-SCNC: 139 MMOL/L (ref 136–145)
WBC # BLD AUTO: 8.4 X10(3) UL (ref 4–11)

## 2022-12-08 PROCEDURE — 99284 EMERGENCY DEPT VISIT MOD MDM: CPT

## 2022-12-08 PROCEDURE — 36415 COLL VENOUS BLD VENIPUNCTURE: CPT

## 2022-12-08 PROCEDURE — 80048 BASIC METABOLIC PNL TOTAL CA: CPT | Performed by: EMERGENCY MEDICINE

## 2022-12-08 PROCEDURE — 10140 I&D HMTMA SEROMA/FLUID COLLJ: CPT

## 2022-12-08 PROCEDURE — 85025 COMPLETE CBC W/AUTO DIFF WBC: CPT | Performed by: EMERGENCY MEDICINE

## 2022-12-08 RX ORDER — CEFADROXIL 500 MG/1
500 CAPSULE ORAL 2 TIMES DAILY
Qty: 10 CAPSULE | Refills: 0 | Status: SHIPPED | OUTPATIENT
Start: 2022-12-08 | End: 2022-12-13

## 2022-12-08 RX ORDER — BUPIVACAINE HYDROCHLORIDE 2.5 MG/ML
20 INJECTION, SOLUTION EPIDURAL; INFILTRATION; INTRACAUDAL ONCE
Status: COMPLETED | OUTPATIENT
Start: 2022-12-08 | End: 2022-12-08

## 2022-12-08 NOTE — ED INITIAL ASSESSMENT (HPI)
Patient from home with c/o wound to her left lower leg after a fall before Thanksgiving. Minimal improvement with antibiotics. Denies fever.

## 2022-12-08 NOTE — ED QUICK NOTES
Patient fell twice; October and mid November. Went to urgent care at [facility]. Took xrays; found nothing, told to keep it elevated and to just ice. Went here for a colonoscopy last week and was told by the RN to see PCP. Abx given; 7 day supply. Went in for a follow up today with pcp and was told to go to the ER. Patient states painful tingling to LLE sometimes. When standing in the morning, feels like something is pulling it.

## 2022-12-09 ENCOUNTER — ORDER TRANSCRIPTION (OUTPATIENT)
Dept: ADMINISTRATIVE | Facility: HOSPITAL | Age: 67
End: 2022-12-09

## 2022-12-09 DIAGNOSIS — Z12.31 ENCOUNTER FOR SCREENING MAMMOGRAM FOR MALIGNANT NEOPLASM OF BREAST: Primary | ICD-10-CM

## 2022-12-09 NOTE — DISCHARGE INSTRUCTIONS
Compressive dressings for the next 3 to 4 days. Change dressings twice daily with antibiotic ointment at each dressing change. Hot water compresses or running hot water soaks 3 times daily for 5 minutes at a time until symptoms much improved, roughly 1 week. Continue antibiotics. Return to the ER for worsening symptoms.

## 2022-12-09 NOTE — ED QUICK NOTES
Discharge instructions provided. All concerns addressed. No further questions. Patient verbalized understanding. Patient ambulatory.

## 2023-01-16 ENCOUNTER — OFFICE VISIT (OUTPATIENT)
Dept: OPHTHALMOLOGY | Facility: CLINIC | Age: 68
End: 2023-01-16

## 2023-01-16 DIAGNOSIS — H26.9 CORTICAL CATARACT OF BOTH EYES: Primary | ICD-10-CM

## 2023-01-16 DIAGNOSIS — H52.4 PRESBYOPIA: ICD-10-CM

## 2023-01-16 DIAGNOSIS — H52.203 HYPEROPIA OF BOTH EYES WITH ASTIGMATISM: ICD-10-CM

## 2023-01-16 DIAGNOSIS — H52.03 HYPEROPIA OF BOTH EYES WITH ASTIGMATISM: ICD-10-CM

## 2023-01-16 NOTE — PATIENT INSTRUCTIONS
Cortical cataract of both eyes  Moderate. See how new glasses help vision. Consider cataract surgery. Hyperopia of both eyes with astigmatism  New glasses. Progressives. Presbyopia  New glasses. Increased add for near.

## 2023-02-13 ENCOUNTER — TELEPHONE (OUTPATIENT)
Dept: PHYSICAL THERAPY | Facility: HOSPITAL | Age: 68
End: 2023-02-13

## 2023-12-15 ENCOUNTER — OFFICE VISIT (OUTPATIENT)
Dept: DERMATOLOGY CLINIC | Facility: CLINIC | Age: 68
End: 2023-12-15

## 2023-12-15 DIAGNOSIS — D23.70 BENIGN NEOPLASM OF SKIN OF LOWER LIMB, INCLUDING HIP, UNSPECIFIED LATERALITY: ICD-10-CM

## 2023-12-15 DIAGNOSIS — L81.4 LENTIGO: ICD-10-CM

## 2023-12-15 DIAGNOSIS — L64.9 ANDROGENETIC ALOPECIA: ICD-10-CM

## 2023-12-15 DIAGNOSIS — D22.9 MULTIPLE NEVI: ICD-10-CM

## 2023-12-15 DIAGNOSIS — L65.9 HAIR LOSS: ICD-10-CM

## 2023-12-15 DIAGNOSIS — L82.1 SK (SEBORRHEIC KERATOSIS): Primary | ICD-10-CM

## 2023-12-15 DIAGNOSIS — D23.60 BENIGN NEOPLASM OF SKIN OF UPPER LIMB, INCLUDING SHOULDER, UNSPECIFIED LATERALITY: ICD-10-CM

## 2023-12-15 DIAGNOSIS — Z85.820 PERSONAL HISTORY OF MALIGNANT MELANOMA OF SKIN: ICD-10-CM

## 2023-12-15 DIAGNOSIS — I87.2 VENOUS STASIS DERMATITIS, UNSPECIFIED LATERALITY: ICD-10-CM

## 2023-12-15 DIAGNOSIS — D23.5 BENIGN NEOPLASM OF SKIN OF TRUNK: ICD-10-CM

## 2023-12-15 DIAGNOSIS — Z85.828 HISTORY OF NONMELANOMA SKIN CANCER: ICD-10-CM

## 2023-12-15 PROCEDURE — 99215 OFFICE O/P EST HI 40 MIN: CPT | Performed by: DERMATOLOGY

## 2023-12-15 RX ORDER — FINASTERIDE 5 MG/1
2.5 TABLET, FILM COATED ORAL DAILY
Qty: 45 TABLET | Refills: 2 | Status: SHIPPED | OUTPATIENT
Start: 2023-12-15

## 2023-12-24 PROBLEM — Z85.820 PERSONAL HISTORY OF MALIGNANT MELANOMA OF SKIN: Status: ACTIVE | Noted: 2023-12-24

## 2023-12-24 NOTE — PROGRESS NOTES
Gabriel Najera is a 80year old female. HPI:     CC:    Chief Complaint   Patient presents with    Full Skin Exam     LOV 12/01/21- Pt presents for a Full Body Skin Exam. Pt denies any areas or lesions of concern. Personal Hx of Melanoma in situ (L anterior thigh- 2021) and Dysplastic Nevus. Allergies:  Patient has no known allergies. 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 left knee    High blood pressure     High cholesterol     Hyperlipidemia     Hyperopia 2010    Incontinence     bladder    Melanoma in situ ([address] 2021    left anterior thigh    Nephrolithiasis     Lithotripsy    Osteoarthritis     Other ill-defined conditions(799.89)     per NG; Heel spurs bilateral - repair    PONV (postoperative nausea and vomiting)     Presbyopia 2010    Screening     Screening of colon    Sleep apnea     CPAP use    Subjective visual disturbance 2010    Tonsillitis     Unspecified essential hypertension     Visual impairment       Past Surgical History:   Procedure Laterality Date    CHOLECYSTECTOMY      COLONOSCOPY      FOOT SURGERY      LITHOTRIPSY      TIEN LOCALIZATION WIRE 1 SITE LEFT (CPT=19281)      OTHER SURGICAL HISTORY  2009    Nasal septoplasty    OTHER SURGICAL HISTORY  2009    SMR Inferior turbinates    OTHER SURGICAL HISTORY  2009    UPPP    T&A        Family History   Problem Relation Age of Onset    Cancer Mother     Melanoma Other         Malignant melanoma    Diabetes Neg     Glaucoma Neg     Macular degeneration Neg       Social History     Socioeconomic History    Marital status:    Tobacco Use    Smoking status: Never    Smokeless tobacco: Never   Substance and Sexual Activity    Alcohol use:  Yes Alcohol/week: 0.0 standard drinks of alcohol     Comment: Occasionally    Drug use: No   Other Topics Concern    Caffeine Concern No    Pt has a pacemaker No    Pt has a defibrillator No    Reaction to local anesthetic No        Current Outpatient Medications   Medication Sig Dispense Refill    finasteride 5 MG Oral Tab Take 0.5 tablets (2.5 mg total) by mouth daily. 45 tablet 2    semaglutide-weight management 1 MG/0.5ML Subcutaneous Solution Auto-injector Inject 0.5 mL (1 mg total) into the skin once a week for 8 doses. (Patient not taking: Reported on 12/15/2023) 2 mL 1    LORazepam (ATIVAN) 1 MG Oral Tab Take 1 tablet (1 mg total) by mouth 2 (two) times daily as needed for Anxiety. 30 tablet 1    escitalopram 10 MG Oral Tab Take 1 tablet (10 mg total) by mouth daily. 30 tablet 1    Olmesartan Medoxomil-HCTZ 20-12.5 MG Oral Tab Take 1 tablet by mouth daily. 30 tablet 0    Multiple Vitamin (MULTI-VITAMIN DAILY) Oral Tab Take by mouth.      carbamide peroxide 6.5 % Otic Solution Place 5 drops into the right ear as needed. 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  left knee    High blood pressure     High cholesterol     Hyperlipidemia     Hyperopia 2010    Incontinence     bladder    Melanoma in situ ([facility] [address] 2021    left anterior thigh    Nephrolithiasis     Lithotripsy    Osteoarthritis     Other ill-defined conditions(799.89)     per NG; Heel spurs bilateral - repair    PONV (postoperative nausea and vomiting)     Presbyopia 2010    Screening     Screening of colon    Sleep apnea     CPAP use    Subjective visual disturbance 2010    Tonsillitis     Unspecified essential hypertension     Visual impairment      Past Surgical History:   Procedure Laterality Date    CHOLECYSTECTOMY      COLONOSCOPY      FOOT SURGERY      LITHOTRIPSY      TIEN LOCALIZATION WIRE 1 SITE LEFT (CPT=19281)      OTHER SURGICAL HISTORY  2009    Nasal septoplasty    OTHER SURGICAL HISTORY  2009    SMR Inferior turbinates    OTHER SURGICAL HISTORY  2009    UP    T&A       Social History     Socioeconomic History    Marital status:      Spouse name: Not on file    Number of children: Not on file    Years of education: Not on file    Highest education level: Not on file   Occupational History    Not on file   Tobacco Use    Smoking status: Never    Smokeless tobacco: Never   Substance and Sexual Activity    Alcohol use:  Yes     Alcohol/week: 0.0 standard drinks of alcohol     Comment: Occasionally    Drug use: No    Sexual activity: Not on file   Other Topics Concern     Service Not Asked    Blood Transfusions Not Asked    Caffeine Concern No    Occupational Exposure Not Asked    Hobby Hazards Not Asked    Sleep Concern Not Asked    Stress Concern Not Asked    Weight Concern Not Asked    Special Diet Not Asked    Back Care Not Asked    Exercise Not Asked    Bike Helmet Not Asked    Seat Belt Not Asked    Self-Exams Not Asked    Grew up on a farm Not Asked    History of tanning Not Asked    Outdoor occupation Not Asked    Pt has a pacemaker No    Pt has a defibrillator No    Breast feeding Not Asked    Reaction to local anesthetic No   Social History Narrative    Not on file     Social Determinants of Health     Financial Resource Strain: Not on file   Food Insecurity: Not on file   Transportation Needs: Not on file   Physical Activity: Not on file   Stress: Not on file   Social Connections: Not on file   Housing Stability: Not on file     Family History   Problem Relation Age of Onset    Cancer Mother     Melanoma Other         Malignant melanoma    Diabetes Neg     Glaucoma Neg Macular degeneration Neg        There were no vitals filed for this visit. HPI:    Chief Complaint   Patient presents with    Full Skin Exam     LOV 12/01/21- Pt presents for a Full Body Skin Exam. Pt denies any areas or lesions of concern. Personal Hx of Melanoma in situ (L anterior thigh- 2021) and Dysplastic Nevus. Past notes/ records and appropriate/relevant lab results including pathology and past body maps reviewed. Including outside notes/ PCP notes as appropriate. Updated and new information noted in current visit. Patient's father with multiple skin cancers  Patient  History of dysplastic nevus 2015    Concern with multiple skin lesions    Patient presents with concerns above. Patient has been in their usual state of health. History, medications, allergies reviewed as noted. ROS:  new relevant systemic complaints as noted       Physical Examination:     Well-developed well-nourished patient alert oriented in no acute distress. Exam total-body performed, including scalp, head, neck, face,nails, hair, external eyes, including conjunctival mucosa, eyelids, lips external ears, back, chest,/ breasts, axillae,  abdomen, arms, legs, palms. Multiple light to medium brown, well marginated, uniformly pigmented, macules and papules 6 mm and less scattered on exam. pigmented lesions examined with dermoscopy benign-appearing patterns. Waxy tannish keratotic papules scattered, cherry-red vascular papules scattered. See map today's date for lesions noted . Otherwise remarkable for lesions as noted on map. See details of examination  See Assessment /Plan for additional history and physical exam also:    Assessment / plan:    No orders of the defined types were placed in this encounter. Meds & Refills for this Visit:  Requested Prescriptions     Signed Prescriptions Disp Refills    finasteride 5 MG Oral Tab 45 tablet 2     Sig: Take 0.5 tablets (2.5 mg total) by mouth daily. Encounter Diagnoses   Name Primary? SK (seborrheic keratosis) Yes    Lentigo     Multiple nevi     Personal history of malignant melanoma of skin     History of nonmelanoma skin cancer     Venous stasis dermatitis, unspecified laterality     Hair loss     Benign neoplasm of skin of lower limb, including hip, unspecified laterality     Benign neoplasm of skin of trunk     Benign neoplasm of skin of upper limb, including shoulder, unspecified laterality        See details on map. Remarkable for:    melanoma in situ from anterior thigh-left post wide excision with oncologic surgery 2021 patient has not been back for follow-up  No evidence of recurrence, incision healed well    Stasis changes noted left lower extremity anteriorly post trauma monitor. History of dysplastic nevus left distal thigh no recurrence. Medium brown macule 6 mm at left lateral lower leg near knee observe. Stable unchanged    Tan-brown papule 8 mm at central upper back observe. Tan-brown papule 6 mm at right upper back observe. Unchanged    New concern of hair loss over the crown anterior scalp  Decreased density is noted. No inflammatory changes are noted, no scaling. No evidence of inflammatory alopecia. 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 Therapeutic options reviewed. See  Medications in grid. Instructions reviewed at length. May take 6 to 8 months to see improvement  Consider increasing to full tablet  Labs reviewed  Chemistries including renal functions, liver functions normal thyroid TSH T4 T3 normal, CBC unremarkable    Patient with history dysplastic nevi monitor his lesions carefully multiple other benign-appearing nevi.     Mid back, left lateral thigh near biopsy site  Waxy tan keratotic papules lesions in areas of concern as noted reassurance given. Benign nature discussed. Possibility of cryo, alphahydroxy acids over-the-counter retinol's discussed. Patient with family history of multiple skin cancers daughter Hermelindo Better monitor carefully    History of rosacea stable  Background of erythema diffuse of the central face telangiectasia's aware some of this may be sun damage continue careful sun protection  . Rosacea. Meds in grid. Skin care instructions reviewed. Pathophysiology reviewed. Chronic recurrent nature discussed. Patient will let us know how they are doing over the next several weeks. Await clinical response to above therapy. Please refer to map for specific lesions. See additional diagnoses. Pros cons of various therapies, risks benefits discussed. Pathophysiology discussed with patient. Therapeutic options reviewed. See  Medications in grid. Instructions reviewed at length. Benign nevi, seborrheic  keratoses, cherry angiomas:  Reassurance regarding other benign skin lesions. Signs and symptoms of skin cancer, ABCDE's of melanoma discussed with patient. Sunscreen use, sun protection, self exams reviewed. Followup as noted RTC routine checkup 6 mos - one year or p.r.n. Encounter Times Including precharting, reviewing chart, prior notes obtaining history: 10 minutes, medical exam :10 minutes, notes on body map, plan, counseling 20minutes My total time spent caring for the patient on the day of the encounter: 40 minutes     The patient indicates understanding of these issues and agrees to the plan. The patient is asked to return as noted in follow-up/ above. This note was generated using Dragon voice recognition software. Please contact me regarding any confusion resulting from errors in recognition.

## 2023-12-26 ENCOUNTER — HOSPITAL ENCOUNTER (OUTPATIENT)
Dept: ULTRASOUND IMAGING | Facility: HOSPITAL | Age: 68
Discharge: HOME | End: 2023-12-26
Attending: INTERNAL MEDICINE
Payer: COMMERCIAL

## 2023-12-26 DIAGNOSIS — I87.2 VENOUS STASIS DERMATITIS OF LEFT LOWER EXTREMITY: ICD-10-CM

## 2023-12-26 PROCEDURE — 93971 EXTREMITY STUDY: CPT | Performed by: INTERNAL MEDICINE

## 2024-05-24 PROBLEM — H25.13 AGE-RELATED NUCLEAR CATARACT OF BOTH EYES: Status: ACTIVE | Noted: 2024-05-14

## 2024-07-25 ENCOUNTER — HOSPITAL ENCOUNTER (OUTPATIENT)
Dept: ULTRASOUND IMAGING | Facility: HOSPITAL | Age: 69
Discharge: HOME | End: 2024-07-25
Attending: INTERNAL MEDICINE
Payer: COMMERCIAL

## 2024-07-25 DIAGNOSIS — M79.605 ACUTE LEG PAIN, LEFT: ICD-10-CM

## 2024-07-25 PROCEDURE — 93926 LOWER EXTREMITY STUDY: CPT | Performed by: INTERNAL MEDICINE

## 2024-11-25 ENCOUNTER — OFFICE VISIT (OUTPATIENT)
Dept: DERMATOLOGY CLINIC | Facility: CLINIC | Age: 69
End: 2024-11-25
Payer: COMMERCIAL

## 2024-11-25 DIAGNOSIS — L81.4 LENTIGINES: ICD-10-CM

## 2024-11-25 DIAGNOSIS — D22.9 MULTIPLE BENIGN NEVI: ICD-10-CM

## 2024-11-25 DIAGNOSIS — L82.1 SEBORRHEIC KERATOSES: Primary | ICD-10-CM

## 2024-11-25 DIAGNOSIS — C43.72 MALIGNANT MELANOMA OF SKIN OF LEFT LOWER EXTREMITY, INCLUDING HIP (HCC): ICD-10-CM

## 2024-11-25 DIAGNOSIS — D18.01 CHERRY ANGIOMA: ICD-10-CM

## 2024-11-25 DIAGNOSIS — L71.9 ROSACEA: ICD-10-CM

## 2024-11-25 PROCEDURE — 99214 OFFICE O/P EST MOD 30 MIN: CPT | Performed by: STUDENT IN AN ORGANIZED HEALTH CARE EDUCATION/TRAINING PROGRAM

## 2024-11-25 RX ORDER — BRIMONIDINE 5 MG/G
GEL TOPICAL
Qty: 30 G | Refills: 6 | Status: SHIPPED | OUTPATIENT
Start: 2024-11-25

## 2024-11-25 NOTE — PROGRESS NOTES
November 25, 2024    Established patient     CHIEF COMPLAINT: FBSE    HISTORY OF PRESENT ILLNESS: .    1. Growth  Location: Face   Duration: Years   Signs and symptoms:  None   Current treatment: None  Past treatments: None     2. 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 left knee    High blood pressure    High cholesterol    Hyperlipidemia    Hyperopia    Incontinence    bladder    Melanoma in situ (HCC)    left anterior thigh    Nephrolithiasis    Lithotripsy    Osteoarthritis    Other ill-defined conditions(799.89)    per NG; Heel spurs bilateral - repair    PONV (postoperative nausea and vomiting)    Presbyopia    Screening    Screening of colon    Sleep apnea    CPAP use    Subjective visual disturbance    Tonsillitis    Unspecified essential hypertension    Visual impairment       REVIEW OF SYSTEMS:  Constitutional: Denies fever, chills, unintentional weight loss.   Skin as per HPI    Medications  Current Outpatient Medications   Medication Sig Dispense Refill    Viloxazine HCl ER (QELBREE) 200 MG Oral Capsule SR 24 Hr Take 1 capsule by mouth daily. 30 capsule 1    triamcinolone 0.1 % External Cream Apply topically 2 (two) times daily. 60 g 2    amoxicillin 500 MG Oral Cap Take 1 capsule (500 mg total) by mouth 3 (three) times daily.      semaglutide-weight management 1.7 MG/0.75ML Subcutaneous Solution Auto-injector Inject 0.75 mL (1.7 mg total) into the skin once a week. 9  mL 1    gabapentin 300 MG Oral Cap Take 1 capsule (300 mg total) by mouth in the morning, at noon, and at bedtime. 270 capsule 0    Olmesartan Medoxomil-HCTZ 20-12.5 MG Oral Tab Take 1 tablet by mouth daily. 90 tablet 1    methylPREDNISolone (MEDROL) 4 MG Oral Tablet Therapy Pack As directed. 1 each 0    amphetamine-dextroamphetamine (ADDERALL) 20 MG Oral Tab Take 1 tablet (20 mg total) by mouth daily. 30 tablet 0    Multiple Vitamin (MULTI-VITAMIN DAILY) Oral Tab Take by mouth.         PHYSICAL EXAM:  Patient declined chaperone   General: awake, alert, no acute distress  Skin: Skin exam was performed today including the following: head and face, scalp, neck, chest (including breasts and axillae), abdomen, back, bilateral upper extremities, bilateral lower extremities, hands, feet, digits, nails. Pertinent findings include:   - Scattered bright red-purple dome-shaped papules on the trunk and extremities   - Scattered light brown stellate macules on sun exposed sites  - Scattered, evenly colored, round brown macules and papules with regular borders on the trunk and extremities  - Numerous scattered skin-colored and brown, waxy, stuck-on papules and plaques on the trunk and extremities      ASSESSMENT & PLAN:  Pathophysiology of diagnoses discussed with patient.  Therapeutic options reviewed. Risks, benefits, and alternatives discussed with patient. Instructions reviewed at length.    #Lentigines  #Seborrheic keratoses   #Cherry angiomas   - Reassurance provided regarding the benign nature of these lesions.    #Multiple benign nevi  - Complete skin exam performed today with no outlier lesions identified   - Reassured patient of benign nature of these lesions.   - Recommend daily photoprotection with broad-spectrum sunscreen, avoidance of sun during peak hours, and sun protective clothing.    - Dermoscopy was used for physical examination of pigmented lesions during today's office visit.    #History of MM  - No  evidence of recurrence on exam. Continued monitoring. Regular skin exams discussed given increased risk of subsequent cutaneous malignancies.   - Should any areas change in size, shape or color, bleed or become tender, the patient will contact the office for evaluation sooner than their interval appointment.    #Rosacea  - Mirvaso daily to affected areas  - Pea sized amount to entire face avoiding eyes and lips  - SE's include redness, flushing, irritation, burning, rash, rebound redness    Return to clinic: 1 year  or sooner if something concerning arises     Julio Bautista MD

## 2024-11-26 ENCOUNTER — TELEPHONE (OUTPATIENT)
Dept: DERMATOLOGY CLINIC | Facility: CLINIC | Age: 69
End: 2024-11-26

## 2024-11-26 ENCOUNTER — MED REC SCAN ONLY (OUTPATIENT)
Dept: DERMATOLOGY CLINIC | Facility: CLINIC | Age: 69
End: 2024-11-26

## 2024-11-26 NOTE — TELEPHONE ENCOUNTER
Fax from [facility]    Approved for Brimonidine Tarttrate from 10/26/24-5/24/25    Placed fax in pa inbox

## (undated) DEVICE — 3M™ STERI-STRIP™ REINFORCED ADHESIVE SKIN CLOSURES, R1547, 1/2 IN X 4 IN (12 MM X 100 MM), 6 STRIPS/ENVELOPE: Brand: 3M™ STERI-STRIP™

## (undated) DEVICE — COVER SGL STRL LGHT HNDL BLU

## (undated) DEVICE — GAMMEX® PI HYBRID SIZE 6, STERILE POWDER-FREE SURGICAL GLOVE, POLYISOPRENE AND NEOPRENE BLEND: Brand: GAMMEX

## (undated) DEVICE — STANDARD HYPODERMIC NEEDLE,POLYPROPYLENE HUB: Brand: MONOJECT

## (undated) DEVICE — TOWEL SURG OR 17X30IN BLUE

## (undated) DEVICE — PROXIMATE SKIN STAPLERS (35 WIDE) CONTAINS 35 STAINLESS STEEL STAPLES (FIXED HEAD): Brand: PROXIMATE

## (undated) DEVICE — SUTURE PROLENE 2-0 SH

## (undated) DEVICE — FLEXIBLE YANKAUER,MEDIUM TIP, NO VACUUM CONTROL: Brand: ARGYLE

## (undated) DEVICE — SOL  .9 1000ML BTL

## (undated) DEVICE — YANKAUER SUCTION INSTRUMENT NO CONTROL VENT, BULB TIP, CLEAR: Brand: YANKAUER

## (undated) DEVICE — SUTURE SILK 2-0 SH

## (undated) DEVICE — 3M™ TEGADERM™ TRANSPARENT FILM DRESSING, 1626W, 4 IN X 4-3/4 IN (10 CM X 12 CM), 50 EACH/CARTON, 4 CARTON/CASE: Brand: 3M™ TEGADERM™

## (undated) DEVICE — DRAPE SHEET LG

## (undated) DEVICE — IMPERVIOUS STOCKINETTE: Brand: DEROYAL

## (undated) DEVICE — MINOR GENERAL: Brand: MEDLINE INDUSTRIES, INC.

## (undated) DEVICE — UNDYED BRAIDED (POLYGLACTIN 910), SYNTHETIC ABSORBABLE SUTURE: Brand: COATED VICRYL

## (undated) DEVICE — PEN: MARKING STD PT 100/CS: Brand: MEDICAL ACTION INDUSTRIES

## (undated) DEVICE — SUTURE VICRYL 3-0 SH

## (undated) DEVICE — SUTURE PDS II 3-0 SH

## (undated) DEVICE — PACK SRG UNV 1 STRL LF

## (undated) DEVICE — GAMMEX® PI HYBRID SIZE 7, STERILE POWDER-FREE SURGICAL GLOVE, POLYISOPRENE AND NEOPRENE BLEND: Brand: GAMMEX

## (undated) DEVICE — SUTURE ETHIBOND EXCEL 2-0 SH

## (undated) DEVICE — CAUTERY BLADE 2IN INS E1455

## (undated) NOTE — ED AVS SNAPSHOT
Jerardo Najera   MRN: [id number]    Department:  [facility] Emergency Department   Date of Visit:  5/29/2019           Disclosure     Insurance plans vary and the physician(s) referred by the ER may not be covered by your plan.  Please contac CARE PHYSICIAN AT ONCE OR RETURN IMMEDIATELY TO THE EMERGENCY DEPARTMENT. If you have been prescribed any medication(s), please fill your prescription right away and begin taking the medication(s) as directed.   If you believe that any of the medications